# Patient Record
Sex: FEMALE | Race: AMERICAN INDIAN OR ALASKA NATIVE | NOT HISPANIC OR LATINO | Employment: STUDENT | URBAN - METROPOLITAN AREA
[De-identification: names, ages, dates, MRNs, and addresses within clinical notes are randomized per-mention and may not be internally consistent; named-entity substitution may affect disease eponyms.]

---
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
## 2024-09-26 ENCOUNTER — OFFICE VISIT (OUTPATIENT)
Dept: OBSTETRICS AND GYNECOLOGY | Facility: CLINIC | Age: 19
End: 2024-09-26
Payer: COMMERCIAL

## 2024-09-26 VITALS
TEMPERATURE: 98 F | SYSTOLIC BLOOD PRESSURE: 112 MMHG | WEIGHT: 286.38 LBS | DIASTOLIC BLOOD PRESSURE: 74 MMHG | BODY MASS INDEX: 44.95 KG/M2 | HEIGHT: 67 IN

## 2024-09-26 DIAGNOSIS — N94.6 DYSMENORRHEA: ICD-10-CM

## 2024-09-26 DIAGNOSIS — Z30.011 INITIATION OF ORAL CONTRACEPTION: ICD-10-CM

## 2024-09-26 DIAGNOSIS — N92.6 IRREGULAR MENSTRUAL CYCLE: ICD-10-CM

## 2024-09-26 DIAGNOSIS — Z23 NEED FOR HPV VACCINATION: ICD-10-CM

## 2024-09-26 DIAGNOSIS — Z01.411 ABNORMAL GYNECOLOGICAL EXAMINATION: Primary | ICD-10-CM

## 2024-09-26 LAB
B-HCG UR QL: NEGATIVE
CTP QC/QA: YES

## 2024-09-26 PROCEDURE — 87661 TRICHOMONAS VAGINALIS AMPLIF: CPT | Performed by: NURSE PRACTITIONER

## 2024-09-26 PROCEDURE — 99395 PREV VISIT EST AGE 18-39: CPT | Mod: ,,, | Performed by: NURSE PRACTITIONER

## 2024-09-26 PROCEDURE — 87591 N.GONORRHOEAE DNA AMP PROB: CPT | Performed by: NURSE PRACTITIONER

## 2024-09-26 PROCEDURE — 81025 URINE PREGNANCY TEST: CPT | Mod: ,,, | Performed by: NURSE PRACTITIONER

## 2024-09-26 RX ORDER — NORETHINDRONE ACETATE AND ETHINYL ESTRADIOL 1.5-30(21)
1 KIT ORAL DAILY
Qty: 28 TABLET | Refills: 11 | Status: SHIPPED | OUTPATIENT
Start: 2024-09-26 | End: 2024-10-24

## 2024-09-26 NOTE — PROGRESS NOTES
"Chief Complaint: Annual exam    Chief Complaint   Patient presents with    Well Woman     Here for annual GYN exam. LMP 24.    irregular cycles     C/o that she has been having irregular cycles for the past few months. She states that her cycles come every few months or so.        HPI:   19 y.o. F  presents for an annual gyn exam.    Reports menstrual cycles irregular since ocps d/c'd 1 year ago, states "ran out"  LMP 24, previous menses 24, November or 2023.  Not sexually active.    Reports menarche age 12 or 13, menstrual cycles were cyclic x 2 years then became more irregular.  Was taking Loestrin 1., regulated cycles.  Denies family hx thyroid issues.         Labs / Significant Studies:  Gyn History:    Menstrual History  Cycle: Yes  Menarche Age: 12 years  Flow Duration: 7  Flow: Normal  Interval:  (irregular)  Intermenstrual Bleeding: No  Dysmenorrhea: Yes  Dysmenorrhea Severity : Moderate    Menopause  Menopause Age: 1 years    Pap History  HPV Vaccine Completed: No (0/3)    [place]  Sexually Active: No  STI History: No  Contraception: No    Breast History  Last Breast Imaging Date: No  History of Breast Biopsy: No    Family History   Problem Relation Name Age of Onset    Diabetes Father      Hypertension Father      Diabetes Mother      Hypertension Mother      Cervical cancer Neg Hx      Colon cancer Neg Hx      Breast cancer Neg Hx      Ovarian cancer Neg Hx      Uterine cancer Neg Hx           History reviewed. No pertinent past medical history.  Past Surgical History:   Procedure Laterality Date    none       No current outpatient medications on file.    Review of patient's allergies indicates:  No Known Allergies    Social History     Tobacco Use    Smoking status: Never    Smokeless tobacco: Never   Substance Use Topics    Alcohol use: Never    Drug use: Never       Review of Systems:  General/Constitutional: Chills denies. Fatigue/weakness denies. Fever denies. " "Night sweats denies. Hot flashes denies    Respiratory: Cough denies. Hemoptysis denies. SOB denies. Sputum production denies. Wheezing denies .   Cardiovascular: Chest pain denies . Dizziness denies. Palpitations denies. Swelling in hands/feet denies    Gastrointestinal: Abdominal pain denies. Blood in stool denies. Constipation denies. Diarrhea denies. Heartburn denies. Nausea denies. Vomiting denies    Genitourinary: Incontinence denies. Blood in urine denies. Frequent urination denies. Painful urination denies. Urinary urgency denies. Nocturia denies    Gynecologic: Irregular menses admits. Heavy bleeding denies. Painful menses admits Vaginal discharge denies. Vaginal odor denies. Vaginal itching denies. Vaginal lesion denies. Pelvic pain denies. Decreased libido denies. Vulvar lesion denies. Prolapse of genital organs denies. Painful intercourse denies. Postcoital bleeding denies    Psychiatric: Depression denies. Anxiety denies     Physical Exam:   Vitals:    09/26/24 1425   BP: 112/74   BP Location: Right arm   Patient Position: Sitting   Temp: 97.9 °F (36.6 °C)   Weight: 129.9 kg (286 lb 6.4 oz)   Height: 5' 7" (1.702 m)       Body mass index is 44.86 kg/m².       Chaperone: present.     General appearance: healthy, well-nourished and well-developed     Psychiatric: Orientation to time, place and person. Normal mood and affect and active, alert     Skin: Appearance: no rashes or lesions.     Neck:   Neck: supple, FROM, trachea midline. and no masses   Thyroid: no enlargement or nodules and non-tender.       Cardiovascular:   Auscultation: RRR and no murmur.   Peripheral Vascular: no varicosities, LLE edema, RLE edema, calf tenderness, and palpable cord and pedal pulses intact.     Lungs:   Respiratory effort: no intercostal retractions or accessory muscle usage.   Auscultation: no wheezing, rales/crackles, or rhonchi and clear to auscultation.     Breast:   Inspection/Palpation: no tenderness, " discrete/distinct masses, skin changes, or abnormal secretions. Nipple appearance normal.     Abdomen:   Auscultation/Inspection/Palpation: no hepatomegaly, splenomegaly, masses, tenderness or CVA tenderness and soft, non-distended bowel sounds preset.    Hernia: no palpable hernias.     Female Genitalia:    deferred    Lymph Nodes:   Palpation: non tender submandibular nodes, axillary nodes        Assessment:     Patient Active Problem List   Diagnosis    Obesity       Health Maintenance Due   Topic Date Due    Hepatitis C Screening  Never done    Lipid Panel  Never done    HIV Screening  Never done    HPV Vaccines (1 - 3-dose series) Never done    Influenza Vaccine (1) 09/01/2024    COVID-19 Vaccine (3 - 2023-24 season) 09/01/2024     Health Maintenance Topics with due status: Not Due       Topic Last Completion Date    TETANUS VACCINE 09/23/2015         Plan:    Griselda was seen today for well woman and irregular cycles.    Diagnoses and all orders for this visit:    Abnormal gynecological examination  No PAP  Uro Swab GC/CZ/TV  Counseled regarding safe sex practices and prevention of STD's  Discussed contraceptive options.  Discussed HPV vaccine  Advised avoidance of tobacco, alcohol, and illicit drug use  Seat belt  RTC 1 yr   Irregular menstrual cycle  If no menstrual cycle in 2-3 weeks, will call for Provera  Dysmenorrhea  - Educated    - NSAIDs, heating pad, warm bath    - Pain precautions      Need for HPV vaccination  - HPV is a common viral infection that manifests in some patients as anogenital warts.      - HPV infection is acquired through direct genital contact.     - Educated she may be at risk for other sexually transmitted diseases     - Advised pt on Gardasil vaccine and correct doses to be administered if pt desires.  Will consider        Initiation of oral contraception  Begin Loestrin 1.5/30 with next menstrual cycle

## 2024-09-28 LAB
C TRACH RRNA SPEC QL NAA+PROBE: NEGATIVE
N GONORRHOEA RRNA SPEC QL NAA+PROBE: NEGATIVE
SPECIMEN SOURCE: NORMAL
T VAGINALIS RRNA SPEC QL NAA+PROBE: NEGATIVE